# Patient Record
Sex: FEMALE | Race: WHITE | NOT HISPANIC OR LATINO | URBAN - METROPOLITAN AREA
[De-identification: names, ages, dates, MRNs, and addresses within clinical notes are randomized per-mention and may not be internally consistent; named-entity substitution may affect disease eponyms.]

---

## 2018-11-18 ENCOUNTER — SURGERY - HEALTHEAST (OUTPATIENT)
Dept: SURGERY | Facility: HOSPITAL | Age: 68
End: 2018-11-18

## 2018-11-18 ENCOUNTER — ANESTHESIA - HEALTHEAST (OUTPATIENT)
Dept: SURGERY | Facility: HOSPITAL | Age: 68
End: 2018-11-18

## 2018-11-18 ASSESSMENT — MIFFLIN-ST. JEOR
SCORE: 1176.33
SCORE: 1184.04

## 2018-11-20 ASSESSMENT — MIFFLIN-ST. JEOR: SCORE: 1185.85

## 2018-11-21 ASSESSMENT — MIFFLIN-ST. JEOR: SCORE: 1163.62

## 2018-11-22 ASSESSMENT — MIFFLIN-ST. JEOR: SCORE: 1145.48

## 2018-11-26 ENCOUNTER — RECORDS - HEALTHEAST (OUTPATIENT)
Dept: LAB | Facility: CLINIC | Age: 68
End: 2018-11-26

## 2018-11-27 LAB
ANION GAP SERPL CALCULATED.3IONS-SCNC: 8 MMOL/L (ref 5–18)
BUN SERPL-MCNC: 26 MG/DL (ref 8–22)
CALCIUM SERPL-MCNC: 9.9 MG/DL (ref 8.5–10.5)
CHLORIDE BLD-SCNC: 102 MMOL/L (ref 98–107)
CO2 SERPL-SCNC: 29 MMOL/L (ref 22–31)
CREAT SERPL-MCNC: 0.92 MG/DL (ref 0.6–1.1)
GFR SERPL CREATININE-BSD FRML MDRD: >60 ML/MIN/1.73M2
GLUCOSE BLD-MCNC: 83 MG/DL (ref 70–125)
POTASSIUM BLD-SCNC: 4.3 MMOL/L (ref 3.5–5)
SODIUM SERPL-SCNC: 139 MMOL/L (ref 136–145)

## 2018-12-03 ENCOUNTER — OFFICE VISIT - HEALTHEAST (OUTPATIENT)
Dept: GERIATRICS | Facility: CLINIC | Age: 68
End: 2018-12-03

## 2018-12-03 DIAGNOSIS — J96.01 ACUTE RESPIRATORY FAILURE WITH HYPOXIA (H): ICD-10-CM

## 2018-12-03 DIAGNOSIS — J44.9 CHRONIC OBSTRUCTIVE PULMONARY DISEASE, UNSPECIFIED COPD TYPE (H): ICD-10-CM

## 2018-12-03 DIAGNOSIS — S72.001D CLOSED FRACTURE OF RIGHT HIP WITH ROUTINE HEALING, SUBSEQUENT ENCOUNTER: ICD-10-CM

## 2018-12-03 DIAGNOSIS — D64.9 ANEMIA, UNSPECIFIED TYPE: ICD-10-CM

## 2018-12-07 ENCOUNTER — OFFICE VISIT - HEALTHEAST (OUTPATIENT)
Dept: GERIATRICS | Facility: CLINIC | Age: 68
End: 2018-12-07

## 2018-12-07 DIAGNOSIS — K59.00 CONSTIPATION, UNSPECIFIED CONSTIPATION TYPE: ICD-10-CM

## 2018-12-07 DIAGNOSIS — J44.9 CHRONIC OBSTRUCTIVE PULMONARY DISEASE, UNSPECIFIED COPD TYPE (H): ICD-10-CM

## 2018-12-07 DIAGNOSIS — S72.001D CLOSED FRACTURE OF RIGHT HIP WITH ROUTINE HEALING, SUBSEQUENT ENCOUNTER: ICD-10-CM

## 2018-12-07 DIAGNOSIS — D64.9 ANEMIA, UNSPECIFIED TYPE: ICD-10-CM

## 2018-12-07 DIAGNOSIS — F17.200 TOBACCO DEPENDENCE: ICD-10-CM

## 2018-12-07 DIAGNOSIS — E87.6 HYPOKALEMIA: ICD-10-CM

## 2018-12-10 ENCOUNTER — OFFICE VISIT - HEALTHEAST (OUTPATIENT)
Dept: GERIATRICS | Facility: CLINIC | Age: 68
End: 2018-12-10

## 2018-12-10 DIAGNOSIS — S72.001D CLOSED FRACTURE OF RIGHT HIP WITH ROUTINE HEALING, SUBSEQUENT ENCOUNTER: ICD-10-CM

## 2018-12-10 DIAGNOSIS — J44.9 CHRONIC OBSTRUCTIVE PULMONARY DISEASE, UNSPECIFIED COPD TYPE (H): ICD-10-CM

## 2018-12-10 DIAGNOSIS — D64.9 ANEMIA, UNSPECIFIED TYPE: ICD-10-CM

## 2018-12-10 DIAGNOSIS — F17.200 TOBACCO DEPENDENCE: ICD-10-CM

## 2018-12-10 RX ORDER — AMOXICILLIN 250 MG
1 CAPSULE ORAL 2 TIMES DAILY PRN
Status: SHIPPED | COMMUNITY
Start: 2018-12-10

## 2018-12-20 ENCOUNTER — OFFICE VISIT - HEALTHEAST (OUTPATIENT)
Dept: GERIATRICS | Facility: CLINIC | Age: 68
End: 2018-12-20

## 2018-12-20 DIAGNOSIS — J44.9 CHRONIC OBSTRUCTIVE PULMONARY DISEASE, UNSPECIFIED COPD TYPE (H): ICD-10-CM

## 2018-12-20 DIAGNOSIS — D64.9 ANEMIA, UNSPECIFIED TYPE: ICD-10-CM

## 2018-12-20 DIAGNOSIS — Z87.81 S/P RIGHT HIP FRACTURE: ICD-10-CM

## 2018-12-20 DIAGNOSIS — J96.01 ACUTE RESPIRATORY FAILURE WITH HYPOXIA (H): ICD-10-CM

## 2018-12-21 ENCOUNTER — RECORDS - HEALTHEAST (OUTPATIENT)
Dept: LAB | Facility: CLINIC | Age: 68
End: 2018-12-21

## 2018-12-21 LAB
ANION GAP SERPL CALCULATED.3IONS-SCNC: 9 MMOL/L (ref 5–18)
BUN SERPL-MCNC: 22 MG/DL (ref 8–22)
CALCIUM SERPL-MCNC: 9.9 MG/DL (ref 8.5–10.5)
CHLORIDE BLD-SCNC: 98 MMOL/L (ref 98–107)
CO2 SERPL-SCNC: 24 MMOL/L (ref 22–31)
CREAT SERPL-MCNC: 0.91 MG/DL (ref 0.6–1.1)
GFR SERPL CREATININE-BSD FRML MDRD: >60 ML/MIN/1.73M2
GLUCOSE BLD-MCNC: 80 MG/DL (ref 70–125)
HGB BLD-MCNC: 10.4 G/DL (ref 12–16)
POTASSIUM BLD-SCNC: 4 MMOL/L (ref 3.5–5)
SODIUM SERPL-SCNC: 131 MMOL/L (ref 136–145)

## 2018-12-30 ENCOUNTER — OFFICE VISIT - HEALTHEAST (OUTPATIENT)
Dept: GERIATRICS | Facility: CLINIC | Age: 68
End: 2018-12-30

## 2018-12-30 DIAGNOSIS — R53.81 DEBILITY: ICD-10-CM

## 2018-12-30 DIAGNOSIS — Z87.81 S/P RIGHT HIP FRACTURE: ICD-10-CM

## 2018-12-30 DIAGNOSIS — J44.9 CHRONIC OBSTRUCTIVE PULMONARY DISEASE, UNSPECIFIED COPD TYPE (H): ICD-10-CM

## 2018-12-30 DIAGNOSIS — D64.9 ANEMIA, UNSPECIFIED TYPE: ICD-10-CM

## 2019-01-07 ENCOUNTER — OFFICE VISIT - HEALTHEAST (OUTPATIENT)
Dept: GERIATRICS | Facility: CLINIC | Age: 69
End: 2019-01-07

## 2019-01-07 DIAGNOSIS — J44.9 CHRONIC OBSTRUCTIVE PULMONARY DISEASE, UNSPECIFIED COPD TYPE (H): ICD-10-CM

## 2019-01-07 DIAGNOSIS — Z87.81 S/P RIGHT HIP FRACTURE: ICD-10-CM

## 2019-01-07 DIAGNOSIS — R53.81 DEBILITY: ICD-10-CM

## 2019-01-07 DIAGNOSIS — D64.9 ANEMIA, UNSPECIFIED TYPE: ICD-10-CM

## 2019-01-07 RX ORDER — BISACODYL 10 MG
10 SUPPOSITORY, RECTAL RECTAL DAILY PRN
Status: SHIPPED | COMMUNITY
Start: 2019-01-07

## 2019-01-07 RX ORDER — POLYETHYLENE GLYCOL 3350 17 G/17G
17 POWDER, FOR SOLUTION ORAL DAILY PRN
Status: SHIPPED | COMMUNITY
Start: 2019-01-07

## 2019-01-08 ENCOUNTER — RECORDS - HEALTHEAST (OUTPATIENT)
Dept: LAB | Facility: CLINIC | Age: 69
End: 2019-01-08

## 2019-01-08 LAB
ANION GAP SERPL CALCULATED.3IONS-SCNC: 8 MMOL/L (ref 5–18)
BUN SERPL-MCNC: 19 MG/DL (ref 8–22)
CALCIUM SERPL-MCNC: 10 MG/DL (ref 8.5–10.5)
CHLORIDE BLD-SCNC: 97 MMOL/L (ref 98–107)
CO2 SERPL-SCNC: 24 MMOL/L (ref 22–31)
CREAT SERPL-MCNC: 0.86 MG/DL (ref 0.6–1.1)
GFR SERPL CREATININE-BSD FRML MDRD: >60 ML/MIN/1.73M2
GLUCOSE BLD-MCNC: 90 MG/DL (ref 70–125)
POTASSIUM BLD-SCNC: 4 MMOL/L (ref 3.5–5)
SODIUM SERPL-SCNC: 129 MMOL/L (ref 136–145)

## 2019-01-09 ENCOUNTER — AMBULATORY - HEALTHEAST (OUTPATIENT)
Dept: GERIATRICS | Facility: CLINIC | Age: 69
End: 2019-01-09

## 2019-01-11 ENCOUNTER — RECORDS - HEALTHEAST (OUTPATIENT)
Dept: LAB | Facility: CLINIC | Age: 69
End: 2019-01-11

## 2019-01-11 LAB — SODIUM SERPL-SCNC: 129 MMOL/L (ref 136–145)

## 2021-01-01 ENCOUNTER — RECORDS - HEALTHEAST (OUTPATIENT)
Dept: ADMINISTRATIVE | Facility: CLINIC | Age: 71
End: 2021-01-01

## 2021-01-01 ENCOUNTER — COMMUNICATION - HEALTHEAST (OUTPATIENT)
Dept: SCHEDULING | Facility: CLINIC | Age: 71
End: 2021-01-01

## 2021-01-01 VITALS — BODY MASS INDEX: 27.51 KG/M2 | WEIGHT: 150.4 LBS

## 2021-01-01 VITALS — BODY MASS INDEX: 27.44 KG/M2 | WEIGHT: 150 LBS

## 2021-01-01 VITALS — BODY MASS INDEX: 27.27 KG/M2 | WEIGHT: 148.2 LBS | HEIGHT: 62 IN

## 2021-06-16 PROBLEM — S72.001A HIP FRACTURE, RIGHT, CLOSED, INITIAL ENCOUNTER (H): Status: ACTIVE | Noted: 2018-11-18

## 2021-06-18 NOTE — LETTER
Letter by Marilyn Arias MD at      Author: Marilyn Arias MD Service: -- Author Type: --    Filed:  Encounter Date: 12/30/2018 Status: (Other)         Patient: Johana Santiago   MR Number: [id number]   YOB: 1950   Date of Visit: 12/30/2018     [facility]    Facility:   [facility] [[id number]]   Code Status: FULL CODE       Chief Complaint   Patient presents with   ? Follow Up     LTC 12/30/18.       HPI:   Johana is a 68 y.o. female who was recently hospitalized as per below.     67-year-old female with past medical history of hypertension, hyperlipidemia, chronic back pain, COPD, smoking abuse, diastolic CHF, who presented to the emergency room for evaluation of right hip pain after fall, she was admitted with hip fracture.    Patient postoperatively developed acute pulmonary edema with respiratory failure, requiring transfer to ICU for BiPAP.     Acute respiratory failure with hypoxia and hypercapnia,  - due to mucous plugging, volume overload/pulmonary edema, and community-acquired pneumonia plus history of COPD  - Improving, patient is off BiPAP .  - Appreciate pulmonary consult  - Continue with scheduled DuoNeb, as needed albuterol neb.    - Transfer out of ICU yesterday  - Patient still requires oxygen, down to 2- 3 L, likely will need oxygen at TCU given her long COPD history     Acute on chronic CHF   - Diastolic dysfunction, with pulmonary edema and respiratory failure.   - Probably precipitated by mucous plug and pneumonia  - BNP elevated at 581.  - Echo showed EF 67% plus diastolic dysfunction.  - Bedside IVC ultrasound on 11/21-showed non-collapsible IVC.    - Improving with IV diuresis   - Switch to oral Lasix  - Follow-up with CHF clinic as outpatient     Sepsis  - Probably secondary to pneumonia and/or mucous plugging.   - Improving  - Continue antibiotic with ceftriaxone and doxycycline.   - White blood cells is  improving     Acute right hip fracture  - Status post surgery  - POD#7  - Appreciate orthopedic consult  - Postoperative orders as per orthopedics  - Continue PT/OT evaluation  - Plan for TCU on discharge     Essential hypertension  - holding home antihypertensive, to give room for diuresis.  - Restart low dose of metoprolol because of increased heart rate with holding paramedics  - Continue to monitor blood pressure at TCU     JANE (acute kidney injury)   - Probably secondary to sepsis  - Improving, discontinue IV fluid  - Avoid nephrotoxins drugs  - Continue to monitor renal function at TCU     COPD  - Chronic, patient was still smoking  - Discussed with patient to quit smoking  - Continue inhalers and nebs  - Continue  oxygen at TCU with plan to wean off as tolerated     Anxiety and depression  - continue home medications     Chronic back pain   - Stable  - Continue with pain control as above      Hypokalemia  - Replace per protocol  - Monitor potassium level at TCU     Smoking dependence  - Discussed with patient to quit  - Continue nicotine patch     Weakness and deconditioning  - Secondary to above  - PT/OT evaluation  - Plan for TCU on discharge     Overall stabilized and discharged to TCU on 11/25/18 for PT, OT, nursing cares, medical management and monitoring.       Past Medical History:  Past Medical History:   Diagnosis Date   ? Chronic bilateral low back pain    ? Depression    ? HTN (hypertension)    ? Idiopathic scoliosis of lumbar region    ? Incontinent of urine    ? Tobacco abuse        Medications:  Current Outpatient Medications   Medication Sig   ? acetaminophen (TYLENOL) 500 MG tablet Take 2 tablets (1,000 mg total) by mouth 3 (three) times a day. (Patient taking differently: Take 1,000 mg by mouth 3 (three) times a day And 1000 mg  once daily prn.      )   ? albuterol (PROAIR HFA;PROVENTIL HFA;VENTOLIN HFA) 90 mcg/actuation inhaler Inhale 2 puffs every 6 (six) hours as needed for wheezing.   ?  amLODIPine (NORVASC) 5 MG tablet Take 5 mg by mouth every evening.   ? aspirin 325 MG tablet Take 1 tablet (325 mg total) by mouth 2 (two) times a day.   ? FLUoxetine (PROZAC) 20 MG capsule Take 20 mg by mouth daily Take with 40 mg capsule for a total daily dose of 60 mg .   ? FLUoxetine (PROZAC) 40 MG capsule Take 40 mg by mouth daily Take with 20 mg capsule for a total daily dose of 60 mg .   ? furosemide (LASIX) 20 MG tablet Take 1 tablet (20 mg total) by mouth daily.   ? gabapentin (NEURONTIN) 100 MG capsule Take 100 mg by mouth 2 (two) times a day.   ? ipratropium-albuterol (DUO-NEB) 0.5-2.5 mg/3 mL nebulizer Take 3 mL by nebulization 4 (four) times a day.   ? metoprolol tartrate (LOPRESSOR) 50 MG tablet Take 0.5 tablets (25 mg total) by mouth 2 (two) times a day.   ? omeprazole (PRILOSEC) 40 MG capsule Take 40 mg by mouth daily before breakfast.   ? potassium chloride (KLOR-CON) 20 mEq packet Take 20 mEq by mouth 2 (two) times a day.   ? rizatriptan (MAXALT) 10 MG tablet Take 10 mg by mouth as needed for migraine May repeat in 2 hours if needed .   ? senna-docusate (SENNOSIDES-DOCUSATE SODIUM) 8.6-50 mg tablet Take 1 tablet by mouth 2 (two) times a day.   ? sucralfate (CARAFATE) 1 gram tablet Take 1 g by mouth daily as needed.       Physical Exam:   General: Patient is alert.  Vitals: Reviewed.  HEENT: Head is NCAT. Eyes show no injection or icterus. Nares negative. Oropharynx well hydrated.  Neck: Supple. No tenderness or adenopathy. No JVD.  Lungs: Clear bilaterally. No wheezes.  Cardiovascular: Regular rate and rhythm, normal S1. S2.  Back: No spinal or CVA tenderness.  Abdomen: Soft, no tenderness on exam. Bowel sounds present. No guarding rebound or rigidity.  Extremities: No distal edema is noted.  Musculoskeletal: Healing right hip incision.   Psych: Mood appears good.      Labs:  Results for orders placed or performed in visit on 11/27/18   Basic Metabolic Panel   Result Value Ref Range    Sodium 139  136 - 145 mmol/L    Potassium 4.3 3.5 - 5.0 mmol/L    Chloride 102 98 - 107 mmol/L    CO2 29 22 - 31 mmol/L    Anion Gap, Calculation 8 5 - 18 mmol/L    Glucose 83 70 - 125 mg/dL    Calcium 9.9 8.5 - 10.5 mg/dL    BUN 26 (H) 8 - 22 mg/dL    Creatinine 0.92 0.60 - 1.10 mg/dL    GFR MDRD Af Amer >60 >60 mL/min/1.73m2    GFR MDRD Non Af Amer >60 >60 mL/min/1.73m2       Lab Results   Component Value Date    WBC 11.5 (H) 11/25/2018    HGB 10.4 (L) 12/21/2018    HCT 30.4 (L) 11/25/2018    MCV 92 11/25/2018     (H) 11/25/2018         Assessment/Plan:  1. Right hip fx. S/p surgical repair on 11/18/18. Follow up per ortho, continue here in therapy.  2. Anemia. Post op acute blood loss. Stable.  3. COPD. Now off oxygen, stable.  4. Debility. Encourage therapy, mobility, strengthening.          Electronically signed by: Marilyn Arias MD

## 2021-06-18 NOTE — LETTER
Letter by Nathalia Carroll CNP at      Author: Nathalia Carroll CNP Service: -- Author Type: --    Filed:  Encounter Date: 1/7/2019 Status: (Other)         Patient: Johana Santiago   MR Number: [id number]   YOB: 1950   Date of Visit: 1/7/2019     [facility]    Facility:   [facility] [[id number]]   Code Status: FULL CODE  PCP: Meghan Latham MD   Phone: [phone]   Fax: [phone]      CHIEF COMPLAINT/REASON FOR VISIT:  Chief Complaint   Patient presents with   ? Discharge Summary       HISTORY COURSE:  Johana is a 68 y.o. female undergoing physical , occupational  and speech therapy at [facility]. She is with past medical history of hypertension, hyperlipidemia, chronic back pain, COPD, smoking abuse, diastolic CHF, who presented to the emergency room for evaluation of right hip pain after fall, she was admitted with hip fracture.  Patient postoperatively developed acute pulmonary edema with respiratory failure, requiring transfer to ICU for BiPAP.     Today she is seen in her room as a routine visit to review multiple medical issues and a face to face for discharge. She is off the oxygen and no longer using the nebulizer Her hip incision is healel. She did follow up with orthopedics today.  She denied CP or shortness of breath. She denied  Congestion but reports a dry intermittent cough.  Right leg  slightly adducted. She tells me the MD is aware and that she has severe scoliosis. BP's on the lower end and metoprolol decreased. she denied dizziness. Last NA low at 131 and lab to be checked prior to discharge tomorrow. If stable she will discharge to home with her son. She will continue current medications and treatments and will have [facility] home care services PT/OT/HHA and Nursing .       Face to face for discharge and wheelchair.   Due to the diagnosis of fracture of unspecified part of neck of right femur subsequent encounter for  closed fracture with routine healing, acute respiratory failure with hypoxia, acute respiratory failure with hypercapnia, hypoxemia, history of falling, pain, and weakness, my patient has the following limitations of mobility/inability to participate in MR ADLs as such as food progress and transfer and ambulation she also cannot walk with an assistive device such as a cane or walker because the patient has weakness causing impaired balance and places her at high risk for falls her current home has adequate space for maneuvering a manual wheelchair, the patient and caregiver are able to safely use a manual wheelchair, the patient will use a wheelchair daily and use of the wheelchair will improve the participation in MRA DLS for the patient.  Patient will require and benefit from an 18 x 18 inch standard manual wheelchair with bilateral standard foot rest, bilateral full-length armrests and 18 x 18 inch cushion.  A cushion is necessary since by patient sits in the wheelchair for greater than 8 hours/day placing her at high risk for skin breakdown.  This equipment is necessary for the duration of my patients lifetime.    Review of Systems  Constitutional: Positive for fatigue. Negative for activity change, appetite change and fever.   HENT: Negative for congestion.    Respiratory: Negative for cough, shortness of breath and wheezing.    Cardiovascular: Negative for chest pain and leg swelling.   Gastrointestinal:. Negative for abdominal distention, abdominal pain, diarrhea and nausea.   Genitourinary: Negative for dysuria.   Musculoskeletal: Positive for arthralgias. Negative for back pain.   Skin: Negative for color change and wound.   Neurological: Negative for dizziness.   Psychiatric/Behavioral: Negative for agitation, behavioral problems and confusion.          Vitals:    01/07/19 1331   BP: 112/75   Pulse: 86   Resp: 18   Temp: 97.2  F (36.2  C)   SpO2: 93%   Weight: 150 lb 6.4 oz (68.2 kg)       Physical  Exam  Constitutional: She is oriented to person, place, and time. She appears well-developed and well-nourished.   pleasant woman in no acute distress   HENT:   Head: Normocephalic and atraumatic.   Eyes: Conjunctivae are normal. Pupils are equal, round, and reactive to light.   Neck: Normal range of motion. Neck supple.   Cardiovascular: Normal rate, regular rhythm and normal heart sounds.   No murmur heard.  Pulmonary/Chest: Effort normal and breath sounds normal. She has no wheezes. She has no rales.   No longer requiring oxygen.  Abdominal: Soft. Bowel sounds are normal. She exhibits no distension. There is no tenderness.   Musculoskeletal: She exhibits no edema.   Decreased ROM RLE   right leg with a slight adduction  Neurological: She is alert and oriented to person, place, and time.   Skin: Skin is warm and dry.   Right hip incision - healed.   Psychiatric: She has a normal mood and affect. Her behavior is normal.       MEDICATION LIST:  Current Outpatient Medications   Medication Sig   ? acetaminophen (TYLENOL) 500 MG tablet Take 2 tablets (1,000 mg total) by mouth 3 (three) times a day. (Patient taking differently: Take 1,000 mg by mouth 3 (three) times a day And 1000 mg  once daily prn.      )   ? albuterol (PROAIR HFA;PROVENTIL HFA;VENTOLIN HFA) 90 mcg/actuation inhaler Inhale 2 puffs every 6 (six) hours as needed for wheezing.   ? bisacodyl (DULCOLAX) 10 mg suppository Insert 10 mg into the rectum daily as needed.   ? FLUoxetine (PROZAC) 20 MG capsule Take 20 mg by mouth daily Take with 40 mg capsule for a total daily dose of 60 mg .   ? FLUoxetine (PROZAC) 40 MG capsule Take 40 mg by mouth daily Take with 20 mg capsule for a total daily dose of 60 mg .   ? furosemide (LASIX) 20 MG tablet Take 1 tablet (20 mg total) by mouth daily.   ? gabapentin (NEURONTIN) 100 MG capsule Take 100 mg by mouth 2 (two) times a day.   ? ipratropium-albuterol (DUO-NEB) 0.5-2.5 mg/3 mL nebulizer Take 3 mL by nebulization 4  (four) times a day.   ? metoprolol tartrate (LOPRESSOR) 50 MG tablet Take 0.5 tablets (25 mg total) by mouth 2 (two) times a day. (Patient taking differently: Take 12.5 mg by mouth daily.       )   ? omeprazole (PRILOSEC) 40 MG capsule Take 40 mg by mouth daily before breakfast.   ? polyethylene glycol (GLYCOLAX) 17 gram/dose powder Take 17 g by mouth daily as needed.   ? potassium chloride (KLOR-CON) 20 mEq packet Take 20 mEq by mouth 2 (two) times a day.   ? rizatriptan (MAXALT) 10 MG tablet Take 10 mg by mouth as needed for migraine May repeat in 2 hours if needed .   ? senna-docusate (SENNOSIDES-DOCUSATE SODIUM) 8.6-50 mg tablet Take 1 tablet by mouth 2 (two) times a day.   ? sucralfate (CARAFATE) 1 gram tablet Take 1 g by mouth daily as needed.   ? aspirin 325 MG tablet Take 1 tablet (325 mg total) by mouth 2 (two) times a day.       DISCHARGE DIAGNOSIS:    ICD-10-CM    1. S/P right hip fracture Z87.81    2. Anemia, unspecified type D64.9    3. Chronic obstructive pulmonary disease, unspecified COPD type (H) J44.9    4. Debility R53.81        MEDICAL EQUIPMENT NEEDS:  wheelchair    DISCHARGE PLAN/FACE TO FACE:  I certify that services are/were furnished while this patient was under the care of a physician and that a physician or an allowed non-physician practitioner (NPP), had a face-to-face encounter that meets the physician face-to-face encounter requirements. The encounter was in whole, or in part, related to the primary reason for home health. The patient is confined to his/her home and needs intermittent skilled nursing, physical therapy, speech-language pathology, or the continued need for occupational therapy. A plan of care has been established by a physician and is periodically reviewed by a physician.  Date of Face-to-Face Encounter:1/7/19    I certify that, based on my findings, the following services are medically necessary home health services: [facility] PT/OT/HHA and Nursing     My clinical findings  support the need for the above skilled services because: PT/OT for continued strength and endurance following a right hip fracture, HHA to assist with ADl's, and Nursing for medication management     This patient is homebound because:She is deconditioned and easily fatigued following right hip surgery and also with a DX of COPD. She also requires the use of a wheelchair making her unsafe to leave the home unassisted.   The patient is, or has been, under my care and I have initiated the establishment of the plan of care. This patient will be followed by a physician who will periodically review the plan of care.    Schedule follow up visit with primary care provider within 7 days to reestablish care.    Electronically signed by: Nathalia Carroll CNP       Electronically signed by: Marilyn Arias MD

## 2021-06-21 NOTE — ANESTHESIA CARE TRANSFER NOTE
Last vitals:   Vitals:    11/18/18 1734   BP: 167/78   Pulse: 91   Resp: 19   Temp: 37  C (98.6  F)   SpO2: 94%     Patient's level of consciousness is drowsy  Spontaneous respirations: yes  Maintains airway independently: yes  Dentition unchanged: yes  Oropharynx: oropharynx clear of all foreign objects    QCDR Measures:  ASA# 20 - Surgical Safety Checklist: WHO surgical safety checklist completed prior to induction    PQRS# 430 - Adult PONV Prevention: 4558F - Pt received => 2 anti-emetic agents (different classes) preop & intraop  ASA# 8 - Peds PONV Prevention: NA - Not pediatric patient, not GA or 2 or more risk factors NOT present  PQRS# 424 - Ellie-op Temp Management: 4559F - At least one body temp DOCUMENTED => 35.5C or 95.9F within required timeframe  PQRS# 426 - PACU Transfer Protocol: - Transfer of care checklist used  ASA# 14 - Acute Post-op Pain: ASA14B - Patient did NOT experience pain >= 7 out of 10

## 2021-06-21 NOTE — ANESTHESIA POSTPROCEDURE EVALUATION
Patient: Johana Santiago  INTERNAL FIXATION, FRACTURE, HIP, WITH NAIL AND SCREWS  Anesthesia type: general    Patient location: PACU  Last vitals:   Vitals:    11/18/18 1800   BP: 145/63   Pulse: 100   Resp: 17   Temp:    SpO2: 97%     Post vital signs: stable  Level of consciousness: awake and responds to simple questions  Post-anesthesia pain: pain controlled  Post-anesthesia nausea and vomiting: no  Pulmonary: unassisted, return to baseline  Cardiovascular: stable and blood pressure at baseline  Hydration: adequate  Anesthetic events: no    QCDR Measures:  ASA# 11 - Ellie-op Cardiac Arrest: ASA11B - Patient did NOT experience unanticipated cardiac arrest  ASA# 12 - Ellie-op Mortality Rate: ASA12B - Patient did NOT die  ASA# 13 - PACU Re-Intubation Rate: ASA13B - Patient did NOT require a new airway mgmt  ASA# 10 - Composite Anes Safety: ASA10A - No serious adverse event    Additional Notes:

## 2021-06-21 NOTE — ANESTHESIA PREPROCEDURE EVALUATION
Anesthesia Evaluation      Patient summary reviewed   No history of anesthetic complications     Airway   Mallampati: II  Neck ROM: full   Pulmonary - normal exam   (+) a smoker                         Cardiovascular - normal exam  Exercise tolerance: > or = 4 METS  (+) hypertension, , hypercholesterolemia,      Neuro/Psych    (+) depression,     Endo/Other - negative ROS      GI/Hepatic/Renal    (+) GERD well controlled,   chronic renal disease CRI,      Other findings: Hx by scribe from ED is not specific in terms of diagnoses, hx largely from pt and from meds she is on.  Inhalers when has allergies.  GFR 40, K 3.9, WBC 17, Hg 14.1, negative troponin. Upper and lower partials.      Dental    (+) lower dentures and upper dentures                       Anesthesia Plan  Planned anesthetic: spinal  Gen if unable to do SAB.  ASA 3     Anesthetic plan and risks discussed with: patient    Post-op plan: routine recovery

## 2021-06-21 NOTE — ANESTHESIA PROCEDURE NOTES
Spinal Block    Patient location during procedure: OR  Reason for block: primary anesthetic    Staffing:  Performing  Anesthesiologist: Ayaz Walton MD    Preanesthetic Checklist  Completed: patient identified, risks, benefits, and alternatives discussed, timeout performed, consent obtained, airway assessed, oxygen available, suction available, emergency drugs available and hand hygiene performed  Spinal Block  Patient position: left lateral decubitus  Prep: ChloraPrep  Patient monitoring: heart rate, cardiac monitor, continuous pulse ox and blood pressure  Approach: midline  Location: L3-4      Additional Notes:  Multiple passes at 2 levels in midline with 24g spinal needle, the L paramedian; same with 22g needle, unable to get CSF.  Significant curvature noted clinically and on x-rays, so abandoned technique and went to GEN.

## 2021-06-22 NOTE — PROGRESS NOTES
[facility]      Facility:    [facility] [[id number]]  Code Status: FULL CODE       Chief Complaint/Reason for Visit:  Chief Complaint   Patient presents with     H & P     New admit to TCU for hypoxic respiratory failure.        HPI:   Johana is a 68 y.o. female who     Johana Santiago is a 67 y.o. old female with a medical history significant for hypertension, hyperlipidemia, anxiety and depression presents with right hip pain after a fall. Patient reports that she accidentally fell two days ago when she accidentally tripped on the area rug at home. After the fall, she developed right hip pain and needed help to get up. She was not able to walk. She did not come to the hospital because she is hoping that her hip pain could get better over time. However, the right hip gets worse over the last two days and she can not put weight on her right leg. She then came to ER today. In ER, she was found to have right hip fracture. Patient states that she normally walks with a walker or cane due to imbalance because she has chronic lower back pain. No history of stroke. Denies history of heart failure, cardiac arrhythmia, lung diseases or CKD.   67-year-old female with past medical history of hypertension, hyperlipidemia, chronic back pain, COPD, smoking abuse, diastolic CHF, who presented to the emergency room for evaluation of right hip pain after fall, she was admitted with hip fracture.    Patient postoperatively developed acute pulmonary edema with respiratory failure, requiring transfer to ICU for BiPAP.     Acute respiratory failure with hypoxia and hypercapnia,  - due to mucous plugging, volume overload/pulmonary edema, and community-acquired pneumonia plus history of COPD  - Improving, patient is off BiPAP .  - Appreciate pulmonary consult  - Continue with cheduled DuoNeb, as needed albuterol neb.    - Transfer out of ICU yesterday  - Patient still requires oxygen,  down to 2- 3 L, likely will need oxygen at TCU given her long COPD history     Acute on chronic CHF   - Diastolic dysfunction, with pulmonary edema and respiratory failure.   - Probably precipitated by mucous plug and pneumonia  - BNP elevated at 581.  - Echo showed EF 67% plus diastolic dysfunction.  - Bedside IVC ultrasound on 11/21-showed non-collapsible IVC.    - Improving with IV diuresis   - Switch to oral Lasix  - Follow-up with CHF clinic as outpatient     Sepsis  - Probably secondary to pneumonia and/or mucous plugging.   - Improving  - Continue antibiotic with ceftriaxone and doxycycline.   - White blood cells is improving     Acute right hip fracture  - Status post surgery  - POD#7  - Appreciate orthopedic consult  - Postoperative orders as per orthopedics  - Continue PT/OT evaluation  - Plan for TCU on discharge     Essential hypertension  - holding home antihypertensive, to give room for diuresis.  - Restart low dose of metoprolol because of increased heart rate with holding paramedics  - Continue to monitor blood pressure at TCU     JANE (acute kidney injury)   - Probably secondary to sepsis  - Improving, discontinue IV fluid  - Avoid nephrotoxins drugs  - Continue to monitor renal function at TCU     COPD  - Chronic, patient was still smoking  - Discussed with patient to quit smoking  - Continue inhalers and nebs  - Continue  oxygen at TCU with plan to wean off as tolerated     Anxiety and depression  - continue home medications     Chronic back pain   - Stable  - Continue with pain control as above      Hypokalemia  - Replace per protocol  - Monitor potassium level at TCU     Smoking dependence  - Discussed with patient to quit  - Continue nicotine patch     Weakness and deconditioning  - Secondary to above  - PT/OT evaluation  - Plan for TCU on discharge     CODE STATUS  Code     Discussed with family , the patient, orthopedics, nursing staff and discharge planner         Overall stabilized and  discharged to TCU on 11/25/18 for PT, OT, nursing cares, medical management and monitoring.     Today:      Past Medical History:  Past Medical History:   Diagnosis Date     Chronic bilateral low back pain      Depression      HTN (hypertension)      Idiopathic scoliosis of lumbar region      Incontinent of urine      Tobacco abuse            Surgical History:  Past Surgical History:   Procedure Laterality Date     HIP PINNING Right 11/18/2018    Procedure: INTERNAL FIXATION, FRACTURE, HIP, WITH NAIL AND SCREWS;  Surgeon: Karson Lopez MD;  Location: [facility];  Service: Orthopedics     TOTAL KNEE ARTHROPLASTY Bilateral        Family History:   Family History   Problem Relation Age of Onset     Chronic Kidney Disease Father      Diabetes Father        Social History:    Social History     Socioeconomic History     Marital status:      Spouse name: Not on file     Number of children: Not on file     Years of education: Not on file     Highest education level: Not on file   Social Needs     Financial resource strain: Not on file     Food insecurity - worry: Not on file     Food insecurity - inability: Not on file     Transportation needs - medical: Not on file     Transportation needs - non-medical: Not on file   Occupational History     Not on file   Tobacco Use     Smoking status: Current Every Day Smoker     Packs/day: 0.50     Years: 25.00     Pack years: 12.50     Types: Cigarettes   Substance and Sexual Activity     Alcohol use: No     Frequency: Never     Drug use: No     Sexual activity: Not on file   Other Topics Concern     Not on file   Social History Narrative     Not on file       Medications:  Current Outpatient Medications   Medication Sig     acetaminophen (TYLENOL) 500 MG tablet Take 2 tablets (1,000 mg total) by mouth 3 (three) times a day.     albuterol (PROAIR HFA;PROVENTIL HFA;VENTOLIN HFA) 90 mcg/actuation inhaler Inhale 2 puffs every 6 (six) hours as needed for wheezing.      amLODIPine (NORVASC) 5 MG tablet Take 5 mg by mouth every evening.     aspirin 325 MG tablet Take 1 tablet (325 mg total) by mouth 2 (two) times a day.     FLUoxetine (PROZAC) 20 MG capsule Take 20 mg by mouth daily Take with 40 mg capsule for a total daily dose of 60 mg .     FLUoxetine (PROZAC) 40 MG capsule Take 40 mg by mouth daily Take with 20 mg capsule for a total daily dose of 60 mg .     furosemide (LASIX) 20 MG tablet Take 1 tablet (20 mg total) by mouth daily.     gabapentin (NEURONTIN) 100 MG capsule Take 100 mg by mouth 2 (two) times a day.     ipratropium-albuterol (DUO-NEB) 0.5-2.5 mg/3 mL nebulizer Take 3 mL by nebulization 4 (four) times a day.     metoprolol tartrate (LOPRESSOR) 50 MG tablet Take 0.5 tablets (25 mg total) by mouth 2 (two) times a day.     nicotine (NICODERM CQ) 14 mg/24 hr Place 1 patch on the skin daily.     omeprazole (PRILOSEC) 40 MG capsule Take 40 mg by mouth daily before breakfast.     potassium chloride (KLOR-CON) 20 mEq packet Take 20 mEq by mouth 2 (two) times a day.     rizatriptan (MAXALT) 10 MG tablet Take 10 mg by mouth as needed for migraine May repeat in 2 hours if needed .     sucralfate (CARAFATE) 1 gram tablet Take 1 g by mouth daily as needed.       Allergies:  No Known Allergies      Review of Systems:  Pertinent items are noted in HPI.      Physical Exam:   General: Patient is alert,   Vitals: /70, 96/54, Temp 97.8, Pulse 70, RR 20, O2 sat 96% on O2.  HEENT: Head is NCAT. Eyes show no injection or icterus. Nares negative. Oropharynx well hydrated.  Neck: Supple. No tenderness or adenopathy. No JVD.  Lungs: Clear bilaterally. No wheezes.  Cardiovascular: Regular rate and rhythm, normal S1. S2.  Back: No spinal or CVA tenderness.  Abdomen: Soft, no tenderness on exam. Bowel sounds present. No guarding rebound or rigidity.  : Deferred.  Extremities: No edema is noted.  Musculoskeletal:   Skin:  Psych: Mood appears good.      Labs:  Results for orders  placed or performed in visit on 11/27/18   Basic Metabolic Panel   Result Value Ref Range    Sodium 139 136 - 145 mmol/L    Potassium 4.3 3.5 - 5.0 mmol/L    Chloride 102 98 - 107 mmol/L    CO2 29 22 - 31 mmol/L    Anion Gap, Calculation 8 5 - 18 mmol/L    Glucose 83 70 - 125 mg/dL    Calcium 9.9 8.5 - 10.5 mg/dL    BUN 26 (H) 8 - 22 mg/dL    Creatinine 0.92 0.60 - 1.10 mg/dL    GFR MDRD Af Amer >60 >60 mL/min/1.73m2    GFR MDRD Non Af Amer >60 >60 mL/min/1.73m2       Lab Results   Component Value Date    WBC 11.5 (H) 11/25/2018    HGB 10.2 (L) 11/25/2018    HCT 30.4 (L) 11/25/2018    MCV 92 11/25/2018     (H) 11/25/2018         Assessment/Plan:  1. Hypoxic respiratory failure. Post op pneumonia. Wean oxygen as able.  2. COPD.  3. Right hip fracture. S/p surgical repair.   4. Anemia.  5. Tobacco dependence  6. Code status is full code.        Total time greater than 55 minutes, greater than 50% counseling and coordination of care, time spent in interview and examination of patient, review of records, discussion with nursing staff.          Electronically signed by: Marilyn Arias MD

## 2021-06-22 NOTE — PROGRESS NOTES
Code Status:  FULL CODE  Visit Type: Follow Up     Facility:  [facility] [[id number]]        Facility Type: SNF (Skilled Nursing Facility, TCU)    History of Present Illness: Johana Santiago is a 68 y.o. female seen today for TCU follow up. She has medical history for COPD, tobacco dependence, diastolic HF, HTN, HLD, anxiety and depression. She was recently hospitalized for a fall at home in which she sustained a hip fraction and is s/p ORIF.  Post operatively had acute pulmonary edema and was in the ICU on bipap.  She was eventually weaned to O2 by NC and was discharged to the TCU.  She also had hypokalemia in the hospital and lasix was adjusted.      Today, she reports her pain is stable and she is getting along in therapy.  Her BPs have been good with SBPs 90-100s andher wt is stable at 150.9lbs.  She is breathing well on 2L O2 and using duonebs every 6 hours. LS have faint crackle in right base and left base is clear.  She is afebrile and is abstaining from smoking with the nicotine patch.  She states she is motivated to maintain this when at home.  She states she constipated today and does not have anything for constipation.       Review of Systems   Patient denies fever, chills, headache, lightheadedness, dizziness, rhinorrhea, cough, congestion, shortness of breath, chest pain, palpitations, abdominal pain, n/v, diarrhea, constipation, change in appetite, dysuria, frequency, burning or pain with urination.  Otherwise review of systems are negative.         Physical Exam   Vital signs: /64, HR 70, resp 20, 97.1 150.9lbs.   GENERAL APPEARANCE: Well developed, well nourished, in no acute distress.  HEENT: normocephalic, atraumatic  PERRL, sclerae anicteric, conjunctivae pink and moist, EOM intact  NECK: Supple and symmetric. Trachea is midline, no thyromegaly, no adenopathy, and no tenderness  LUNGS: Right base has slight inspiratory crackle, Left base clear. respiratory effort  normal.  CARD: RRR, S1, S2, without murmurs, gallops, rubsABD: Soft and nontender with normal bowel sounds.   MSK: Muscle strength and tone were normal.  EXTREMITIES: No cyanosis, clubbing or edema.  NEURO: Alert and oriented x 3. Normal affect. Sensation to touch was normal. Face is symmetric.  SKIN: Inspection of the skin reveals no rashes, ulcerations or petechiae.  PSYCH: euthymic          Labs:  No results found for this or any previous visit (from the past 240 hour(s)).    Assessment:  1. Closed fracture of right hip with routine healing, subsequent encounter     2. Chronic obstructive pulmonary disease, unspecified COPD type (H)     3. Anemia, unspecified type     4. Hypokalemia     5. Constipation, unspecified constipation type     6. Tobacco dependence         Plan:   Hip ORIF: stable; pain improving on scheduled tylenol with good relief  COPD: controlled; continue duo-nebs; if continues controlled consider changing to daily tiotropium/albuterol inhaler next week.   Anemia: last hgb at dc was 10.2 and WBC was 11.5. She is afebrile so will draw CBC on Monday.   Hypokalemia: last K at dc was 4.3 will draw K on Monday  Constipation: start prn Miralax for intermittent constipation  Tobacco dependence: counseled on abstinence and risk and benefit.       Amena STANLEY CNP,saw and examined the patient and case discussed with Nathalia Carroll CNP.  Nathalia STANLEY CNP, I examined the patient with Amena Mayo and discussed and agree with the medical care given.      Electronically signed by: Nathalia Carroll CNP

## 2021-06-22 NOTE — PROGRESS NOTES
[facility]    Facility:   [facility] [[id number]]   Code Status: FULL CODE  PCP: Meghan Latham MD   Phone: [phone]   Fax: [phone]      CHIEF COMPLAINT/REASON FOR VISIT:  Chief Complaint   Patient presents with     Discharge Summary       HISTORY COURSE:  Johana is a 68 y.o. female undergoing physical , occupational  and speech therapy at [facility]. She is with past medical history of hypertension, hyperlipidemia, chronic back pain, COPD, smoking abuse, diastolic CHF, who presented to the emergency room for evaluation of right hip pain after fall, she was admitted with hip fracture.  Patient postoperatively developed acute pulmonary edema with respiratory failure, requiring transfer to ICU for BiPAP.     Today she is seen in her room as a routine visit to review multiple medical issues and a face to face for discharge. She is off the oxygen and no longer using the nebulizer Her hip incision is healel. She did follow up with orthopedics today.  She denied CP or shortness of breath. She denied  Congestion but reports a dry intermittent cough.  Right leg  slightly adducted. She tells me the MD is aware and that she has severe scoliosis. BP's on the lower end and metoprolol decreased. she denied dizziness. Last NA low at 131 and lab to be checked prior to discharge tomorrow. If stable she will discharge to home with her son. She will continue current medications and treatments and will have hospitals home care services PT/OT/HHA and Nursing .       Face to face for discharge and wheelchair.   Due to the diagnosis of fracture of unspecified part of neck of right femur subsequent encounter for closed fracture with routine healing, acute respiratory failure with hypoxia, acute respiratory failure with hypercapnia, hypoxemia, history of falling, pain, and weakness, my patient has the following limitations of mobility/inability to participate in MR ADLs  as such as food progress and transfer and ambulation she also cannot walk with an assistive device such as a cane or walker because the patient has weakness causing impaired balance and places her at high risk for falls her current home has adequate space for maneuvering a manual wheelchair, the patient and caregiver are able to safely use a manual wheelchair, the patient will use a wheelchair daily and use of the wheelchair will improve the participation in MRA DLS for the patient.  Patient will require and benefit from an 18 x 18 inch standard manual wheelchair with bilateral standard foot rest, bilateral full-length armrests and 18 x 18 inch cushion.  A cushion is necessary since by patient sits in the wheelchair for greater than 8 hours/day placing her at high risk for skin breakdown.  This equipment is necessary for the duration of my patients lifetime.    Review of Systems  Constitutional: Positive for fatigue. Negative for activity change, appetite change and fever.   HENT: Negative for congestion.    Respiratory: Negative for cough, shortness of breath and wheezing.    Cardiovascular: Negative for chest pain and leg swelling.   Gastrointestinal:. Negative for abdominal distention, abdominal pain, diarrhea and nausea.   Genitourinary: Negative for dysuria.   Musculoskeletal: Positive for arthralgias. Negative for back pain.   Skin: Negative for color change and wound.   Neurological: Negative for dizziness.   Psychiatric/Behavioral: Negative for agitation, behavioral problems and confusion.          Vitals:    01/07/19 1331   BP: 112/75   Pulse: 86   Resp: 18   Temp: 97.2  F (36.2  C)   SpO2: 93%   Weight: 150 lb 6.4 oz (68.2 kg)       Physical Exam  Constitutional: She is oriented to person, place, and time. She appears well-developed and well-nourished.   pleasant woman in no acute distress   HENT:   Head: Normocephalic and atraumatic.   Eyes: Conjunctivae are normal. Pupils are equal, round, and reactive  to light.   Neck: Normal range of motion. Neck supple.   Cardiovascular: Normal rate, regular rhythm and normal heart sounds.   No murmur heard.  Pulmonary/Chest: Effort normal and breath sounds normal. She has no wheezes. She has no rales.   No longer requiring oxygen.  Abdominal: Soft. Bowel sounds are normal. She exhibits no distension. There is no tenderness.   Musculoskeletal: She exhibits no edema.   Decreased ROM RLE  right leg with a slight adduction  Neurological: She is alert and oriented to person, place, and time.   Skin: Skin is warm and dry.   Right hip incision - healed.   Psychiatric: She has a normal mood and affect. Her behavior is normal.       MEDICATION LIST:  Current Outpatient Medications   Medication Sig     acetaminophen (TYLENOL) 500 MG tablet Take 2 tablets (1,000 mg total) by mouth 3 (three) times a day. (Patient taking differently: Take 1,000 mg by mouth 3 (three) times a day And 1000 mg  once daily prn.      )     albuterol (PROAIR HFA;PROVENTIL HFA;VENTOLIN HFA) 90 mcg/actuation inhaler Inhale 2 puffs every 6 (six) hours as needed for wheezing.     bisacodyl (DULCOLAX) 10 mg suppository Insert 10 mg into the rectum daily as needed.     FLUoxetine (PROZAC) 20 MG capsule Take 20 mg by mouth daily Take with 40 mg capsule for a total daily dose of 60 mg .     FLUoxetine (PROZAC) 40 MG capsule Take 40 mg by mouth daily Take with 20 mg capsule for a total daily dose of 60 mg .     furosemide (LASIX) 20 MG tablet Take 1 tablet (20 mg total) by mouth daily.     gabapentin (NEURONTIN) 100 MG capsule Take 100 mg by mouth 2 (two) times a day.     ipratropium-albuterol (DUO-NEB) 0.5-2.5 mg/3 mL nebulizer Take 3 mL by nebulization 4 (four) times a day.     metoprolol tartrate (LOPRESSOR) 50 MG tablet Take 0.5 tablets (25 mg total) by mouth 2 (two) times a day. (Patient taking differently: Take 12.5 mg by mouth daily.       )     omeprazole (PRILOSEC) 40 MG capsule Take 40 mg by mouth daily before  breakfast.     polyethylene glycol (GLYCOLAX) 17 gram/dose powder Take 17 g by mouth daily as needed.     potassium chloride (KLOR-CON) 20 mEq packet Take 20 mEq by mouth 2 (two) times a day.     rizatriptan (MAXALT) 10 MG tablet Take 10 mg by mouth as needed for migraine May repeat in 2 hours if needed .     senna-docusate (SENNOSIDES-DOCUSATE SODIUM) 8.6-50 mg tablet Take 1 tablet by mouth 2 (two) times a day.     sucralfate (CARAFATE) 1 gram tablet Take 1 g by mouth daily as needed.     aspirin 325 MG tablet Take 1 tablet (325 mg total) by mouth 2 (two) times a day.       DISCHARGE DIAGNOSIS:    ICD-10-CM    1. S/P right hip fracture Z87.81    2. Anemia, unspecified type D64.9    3. Chronic obstructive pulmonary disease, unspecified COPD type (H) J44.9    4. Debility R53.81        MEDICAL EQUIPMENT NEEDS:  wheelchair    DISCHARGE PLAN/FACE TO FACE:  I certify that services are/were furnished while this patient was under the care of a physician and that a physician or an allowed non-physician practitioner (NPP), had a face-to-face encounter that meets the physician face-to-face encounter requirements. The encounter was in whole, or in part, related to the primary reason for home health. The patient is confined to his/her home and needs intermittent skilled nursing, physical therapy, speech-language pathology, or the continued need for occupational therapy. A plan of care has been established by a physician and is periodically reviewed by a physician.  Date of Face-to-Face Encounter:1/7/19    I certify that, based on my findings, the following services are medically necessary home health services: [facility] PT/OT/HHA and Nursing     My clinical findings support the need for the above skilled services because: PT/OT for continued strength and endurance following a right hip fracture, HHA to assist with ADl's, and Nursing for medication management     This patient is homebound because:She is deconditioned and easily  fatigued following right hip surgery and also with a DX of COPD. She also requires the use of a wheelchair making her unsafe to leave the home unassisted.   The patient is, or has been, under my care and I have initiated the establishment of the plan of care. This patient will be followed by a physician who will periodically review the plan of care.    Schedule follow up visit with primary care provider within 7 days to reestablish care.    Electronically signed by: Nathalia Carroll CNP       Electronically signed by: Marilyn Arias MD

## 2021-06-22 NOTE — PROGRESS NOTES
[facility]    Facility:   [facility] [[id number]]   Code Status: FULL CODE       Chief Complaint   Patient presents with     Follow Up     LTC 12/30/18.       HPI:   Johana is a 68 y.o. female who was recently hospitalized as per below.     67-year-old female with past medical history of hypertension, hyperlipidemia, chronic back pain, COPD, smoking abuse, diastolic CHF, who presented to the emergency room for evaluation of right hip pain after fall, she was admitted with hip fracture.    Patient postoperatively developed acute pulmonary edema with respiratory failure, requiring transfer to ICU for BiPAP.     Acute respiratory failure with hypoxia and hypercapnia,  - due to mucous plugging, volume overload/pulmonary edema, and community-acquired pneumonia plus history of COPD  - Improving, patient is off BiPAP .  - Appreciate pulmonary consult  - Continue with scheduled DuoNeb, as needed albuterol neb.    - Transfer out of ICU yesterday  - Patient still requires oxygen, down to 2- 3 L, likely will need oxygen at TCU given her long COPD history     Acute on chronic CHF   - Diastolic dysfunction, with pulmonary edema and respiratory failure.   - Probably precipitated by mucous plug and pneumonia  - BNP elevated at 581.  - Echo showed EF 67% plus diastolic dysfunction.  - Bedside IVC ultrasound on 11/21-showed non-collapsible IVC.    - Improving with IV diuresis   - Switch to oral Lasix  - Follow-up with CHF clinic as outpatient     Sepsis  - Probably secondary to pneumonia and/or mucous plugging.   - Improving  - Continue antibiotic with ceftriaxone and doxycycline.   - White blood cells is improving     Acute right hip fracture  - Status post surgery  - POD#7  - Appreciate orthopedic consult  - Postoperative orders as per orthopedics  - Continue PT/OT evaluation  - Plan for TCU on discharge     Essential hypertension  - holding home antihypertensive, to give room for  diuresis.  - Restart low dose of metoprolol because of increased heart rate with holding paramedics  - Continue to monitor blood pressure at TCU     JANE (acute kidney injury)   - Probably secondary to sepsis  - Improving, discontinue IV fluid  - Avoid nephrotoxins drugs  - Continue to monitor renal function at TCU     COPD  - Chronic, patient was still smoking  - Discussed with patient to quit smoking  - Continue inhalers and nebs  - Continue  oxygen at TCU with plan to wean off as tolerated     Anxiety and depression  - continue home medications     Chronic back pain   - Stable  - Continue with pain control as above      Hypokalemia  - Replace per protocol  - Monitor potassium level at TCU     Smoking dependence  - Discussed with patient to quit  - Continue nicotine patch     Weakness and deconditioning  - Secondary to above  - PT/OT evaluation  - Plan for TCU on discharge     Overall stabilized and discharged to TCU on 11/25/18 for PT, OT, nursing cares, medical management and monitoring.       Past Medical History:  Past Medical History:   Diagnosis Date     Chronic bilateral low back pain      Depression      HTN (hypertension)      Idiopathic scoliosis of lumbar region      Incontinent of urine      Tobacco abuse        Medications:  Current Outpatient Medications   Medication Sig     acetaminophen (TYLENOL) 500 MG tablet Take 2 tablets (1,000 mg total) by mouth 3 (three) times a day. (Patient taking differently: Take 1,000 mg by mouth 3 (three) times a day And 1000 mg  once daily prn.      )     albuterol (PROAIR HFA;PROVENTIL HFA;VENTOLIN HFA) 90 mcg/actuation inhaler Inhale 2 puffs every 6 (six) hours as needed for wheezing.     amLODIPine (NORVASC) 5 MG tablet Take 5 mg by mouth every evening.     aspirin 325 MG tablet Take 1 tablet (325 mg total) by mouth 2 (two) times a day.     FLUoxetine (PROZAC) 20 MG capsule Take 20 mg by mouth daily Take with 40 mg capsule for a total daily dose of 60 mg .      FLUoxetine (PROZAC) 40 MG capsule Take 40 mg by mouth daily Take with 20 mg capsule for a total daily dose of 60 mg .     furosemide (LASIX) 20 MG tablet Take 1 tablet (20 mg total) by mouth daily.     gabapentin (NEURONTIN) 100 MG capsule Take 100 mg by mouth 2 (two) times a day.     ipratropium-albuterol (DUO-NEB) 0.5-2.5 mg/3 mL nebulizer Take 3 mL by nebulization 4 (four) times a day.     metoprolol tartrate (LOPRESSOR) 50 MG tablet Take 0.5 tablets (25 mg total) by mouth 2 (two) times a day.     omeprazole (PRILOSEC) 40 MG capsule Take 40 mg by mouth daily before breakfast.     potassium chloride (KLOR-CON) 20 mEq packet Take 20 mEq by mouth 2 (two) times a day.     rizatriptan (MAXALT) 10 MG tablet Take 10 mg by mouth as needed for migraine May repeat in 2 hours if needed .     senna-docusate (SENNOSIDES-DOCUSATE SODIUM) 8.6-50 mg tablet Take 1 tablet by mouth 2 (two) times a day.     sucralfate (CARAFATE) 1 gram tablet Take 1 g by mouth daily as needed.       Physical Exam:   General: Patient is alert.  Vitals: Reviewed.  HEENT: Head is NCAT. Eyes show no injection or icterus. Nares negative. Oropharynx well hydrated.  Neck: Supple. No tenderness or adenopathy. No JVD.  Lungs: Clear bilaterally. No wheezes.  Cardiovascular: Regular rate and rhythm, normal S1. S2.  Back: No spinal or CVA tenderness.  Abdomen: Soft, no tenderness on exam. Bowel sounds present. No guarding rebound or rigidity.  Extremities: No distal edema is noted.  Musculoskeletal: Healing right hip incision.   Psych: Mood appears good.      Labs:  Results for orders placed or performed in visit on 11/27/18   Basic Metabolic Panel   Result Value Ref Range    Sodium 139 136 - 145 mmol/L    Potassium 4.3 3.5 - 5.0 mmol/L    Chloride 102 98 - 107 mmol/L    CO2 29 22 - 31 mmol/L    Anion Gap, Calculation 8 5 - 18 mmol/L    Glucose 83 70 - 125 mg/dL    Calcium 9.9 8.5 - 10.5 mg/dL    BUN 26 (H) 8 - 22 mg/dL    Creatinine 0.92 0.60 - 1.10 mg/dL     GFR MDRD Af Amer >60 >60 mL/min/1.73m2    GFR MDRD Non Af Amer >60 >60 mL/min/1.73m2       Lab Results   Component Value Date    WBC 11.5 (H) 11/25/2018    HGB 10.4 (L) 12/21/2018    HCT 30.4 (L) 11/25/2018    MCV 92 11/25/2018     (H) 11/25/2018         Assessment/Plan:  1. Right hip fx. S/p surgical repair on 11/18/18. Follow up per ortho, continue here in therapy.  2. Anemia. Post op acute blood loss. Stable.  3. COPD. Now off oxygen, stable.  4. Debility. Encourage therapy, mobility, strengthening.          Electronically signed by: Marilyn Arias MD

## 2021-06-22 NOTE — PROGRESS NOTES
[facility]    Facility:   [facility] [[id number]]   Code Status: FULL CODE       Chief Complaint   Patient presents with     Follow Up     TCU 12/20/18.       HPI:   Johana is a 68 y.o. female who was recently hospitalized as per below.     67-year-old female with past medical history of hypertension, hyperlipidemia, chronic back pain, COPD, smoking abuse, diastolic CHF, who presented to the emergency room for evaluation of right hip pain after fall, she was admitted with hip fracture.    Patient postoperatively developed acute pulmonary edema with respiratory failure, requiring transfer to ICU for BiPAP.     Acute respiratory failure with hypoxia and hypercapnia,  - due to mucous plugging, volume overload/pulmonary edema, and community-acquired pneumonia plus history of COPD  - Improving, patient is off BiPAP .  - Appreciate pulmonary consult  - Continue with scheduled DuoNeb, as needed albuterol neb.    - Transfer out of ICU yesterday  - Patient still requires oxygen, down to 2- 3 L, likely will need oxygen at TCU given her long COPD history     Acute on chronic CHF   - Diastolic dysfunction, with pulmonary edema and respiratory failure.   - Probably precipitated by mucous plug and pneumonia  - BNP elevated at 581.  - Echo showed EF 67% plus diastolic dysfunction.  - Bedside IVC ultrasound on 11/21-showed non-collapsible IVC.    - Improving with IV diuresis   - Switch to oral Lasix  - Follow-up with CHF clinic as outpatient     Sepsis  - Probably secondary to pneumonia and/or mucous plugging.   - Improving  - Continue antibiotic with ceftriaxone and doxycycline.   - White blood cells is improving     Acute right hip fracture  - Status post surgery  - POD#7  - Appreciate orthopedic consult  - Postoperative orders as per orthopedics  - Continue PT/OT evaluation  - Plan for TCU on discharge     Essential hypertension  - holding home antihypertensive, to give room for  diuresis.  - Restart low dose of metoprolol because of increased heart rate with holding paramedics  - Continue to monitor blood pressure at TCU     JANE (acute kidney injury)   - Probably secondary to sepsis  - Improving, discontinue IV fluid  - Avoid nephrotoxins drugs  - Continue to monitor renal function at TCU     COPD  - Chronic, patient was still smoking  - Discussed with patient to quit smoking  - Continue inhalers and nebs  - Continue  oxygen at TCU with plan to wean off as tolerated     Anxiety and depression  - continue home medications     Chronic back pain   - Stable  - Continue with pain control as above      Hypokalemia  - Replace per protocol  - Monitor potassium level at TCU     Smoking dependence  - Discussed with patient to quit  - Continue nicotine patch     Weakness and deconditioning  - Secondary to above  - PT/OT evaluation  - Plan for TCU on discharge     Overall stabilized and discharged to TCU on 11/25/18 for PT, OT, nursing cares, medical management and monitoring.       Today:  She is progressing slowly with therapy. Still with pain in hip. Saw ortho, no concerns. She has now weaned off oxygen. Denies any shortness of breath. No wheezing. Feels breathing is at baseline. Appetite is decreased. She has been constipated. No abdominal pain. No fever or cough.       Past Medical History:  Past Medical History:   Diagnosis Date     Chronic bilateral low back pain      Depression      HTN (hypertension)      Idiopathic scoliosis of lumbar region      Incontinent of urine      Tobacco abuse        Medications:  Current Outpatient Medications   Medication Sig     acetaminophen (TYLENOL) 500 MG tablet Take 2 tablets (1,000 mg total) by mouth 3 (three) times a day. (Patient taking differently: Take 1,000 mg by mouth 3 (three) times a day And 1000 mg  once daily prn.      )     albuterol (PROAIR HFA;PROVENTIL HFA;VENTOLIN HFA) 90 mcg/actuation inhaler Inhale 2 puffs every 6 (six) hours as needed for  wheezing.     amLODIPine (NORVASC) 5 MG tablet Take 5 mg by mouth every evening.     aspirin 325 MG tablet Take 1 tablet (325 mg total) by mouth 2 (two) times a day.     FLUoxetine (PROZAC) 20 MG capsule Take 20 mg by mouth daily Take with 40 mg capsule for a total daily dose of 60 mg .     FLUoxetine (PROZAC) 40 MG capsule Take 40 mg by mouth daily Take with 20 mg capsule for a total daily dose of 60 mg .     furosemide (LASIX) 20 MG tablet Take 1 tablet (20 mg total) by mouth daily.     gabapentin (NEURONTIN) 100 MG capsule Take 100 mg by mouth 2 (two) times a day.     ipratropium-albuterol (DUO-NEB) 0.5-2.5 mg/3 mL nebulizer Take 3 mL by nebulization 4 (four) times a day.     metoprolol tartrate (LOPRESSOR) 50 MG tablet Take 0.5 tablets (25 mg total) by mouth 2 (two) times a day.     nicotine (NICODERM CQ) 14 mg/24 hr Place 1 patch on the skin daily.     omeprazole (PRILOSEC) 40 MG capsule Take 40 mg by mouth daily before breakfast.     potassium chloride (KLOR-CON) 20 mEq packet Take 20 mEq by mouth 2 (two) times a day.     rizatriptan (MAXALT) 10 MG tablet Take 10 mg by mouth as needed for migraine May repeat in 2 hours if needed .     senna-docusate (SENNOSIDES-DOCUSATE SODIUM) 8.6-50 mg tablet Take 1 tablet by mouth 2 (two) times a day.     sucralfate (CARAFATE) 1 gram tablet Take 1 g by mouth daily as needed.       Physical Exam:   General: Patient is alert.  HEENT: Head is NCAT. Eyes show no injection or icterus. Nares negative. Oropharynx well hydrated.  Neck: Supple. No tenderness or adenopathy. No JVD.  Lungs: Clear bilaterally. No wheezes.  Cardiovascular: Regular rate and rhythm, normal S1. S2.  Back: No spinal or CVA tenderness.  Abdomen: Soft, no tenderness on exam. Bowel sounds present. No guarding rebound or rigidity.  : Deferred.  Extremities: No distal edema is noted.  Musculoskeletal: Healing right hip incision.   Psych: Mood appears good.      Labs:  Results for orders placed or performed in  visit on 11/27/18   Basic Metabolic Panel   Result Value Ref Range    Sodium 139 136 - 145 mmol/L    Potassium 4.3 3.5 - 5.0 mmol/L    Chloride 102 98 - 107 mmol/L    CO2 29 22 - 31 mmol/L    Anion Gap, Calculation 8 5 - 18 mmol/L    Glucose 83 70 - 125 mg/dL    Calcium 9.9 8.5 - 10.5 mg/dL    BUN 26 (H) 8 - 22 mg/dL    Creatinine 0.92 0.60 - 1.10 mg/dL    GFR MDRD Af Amer >60 >60 mL/min/1.73m2    GFR MDRD Non Af Amer >60 >60 mL/min/1.73m2       Lab Results   Component Value Date    WBC 11.5 (H) 11/25/2018    HGB 10.2 (L) 11/25/2018    HCT 30.4 (L) 11/25/2018    MCV 92 11/25/2018     (H) 11/25/2018         Assessment/Plan:  1. Right hip fracture. S/p surgery on 11/18/18. Continue in therapy.   2. Anemia. Post op ABLA. Stable.  3. COPD. No current acute issues.   4. Hypoxic respiratory failure. Resolved. No longer requires oxygen.  5. Debility. Continue efforts in therapy as able.          Electronically signed by: Marilyn Arias MD

## 2021-06-22 NOTE — PROGRESS NOTES
[facility]    Facility:   [facility] [[id number]]   Code Status: FULL CODE      CHIEF COMPLAINT/REASON FOR VISIT:  Chief Complaint   Patient presents with     Review Of Multiple Medical Conditions       HISTORY:      HPI: Johana is a 68 y.o. female undergoing physical , occupational  and speech therapy at [facility]. She is with past medical history of hypertension, hyperlipidemia, chronic back pain, COPD, smoking abuse, diastolic CHF, who presented to the emergency room for evaluation of right hip pain after fall, she was admitted with hip fracture.  Patient postoperatively developed acute pulmonary edema with respiratory failure, requiring transfer to ICU for BiPAP.    Today she is seen in her room as a routine visit to review multiple medical issues. She is down to 1L NC and sats are 94%. Her hip incision is healing well. She denied CP or shortness of breath. She denied cough or congestion, feels slightly constipated. Right leg slightly adducted. She tells me the MD is aware and that she has sever scoliosis. BP's on the lower end and she denied dizziness. Her HGB is improving and is up to  10.2 up from 9.9.    Past Medical History:   Diagnosis Date     Chronic bilateral low back pain      Depression      HTN (hypertension)      Idiopathic scoliosis of lumbar region      Incontinent of urine      Tobacco abuse              Family History   Problem Relation Age of Onset     Chronic Kidney Disease Father      Diabetes Father      Social History     Socioeconomic History     Marital status:      Spouse name: Not on file     Number of children: Not on file     Years of education: Not on file     Highest education level: Not on file   Social Needs     Financial resource strain: Not on file     Food insecurity - worry: Not on file     Food insecurity - inability: Not on file     Transportation needs - medical: Not on file     Transportation needs -  non-medical: Not on file   Occupational History     Not on file   Tobacco Use     Smoking status: Current Every Day Smoker     Packs/day: 0.50     Years: 25.00     Pack years: 12.50     Types: Cigarettes   Substance and Sexual Activity     Alcohol use: No     Frequency: Never     Drug use: No     Sexual activity: Not on file   Other Topics Concern     Not on file   Social History Narrative     Not on file         Review of Systems   Constitutional: Positive for fatigue. Negative for activity change, appetite change and fever.   HENT: Negative for congestion.    Respiratory: Negative for cough, shortness of breath and wheezing.    Cardiovascular: Negative for chest pain and leg swelling.   Gastrointestinal: Positive for constipation. Negative for abdominal distention, abdominal pain, diarrhea and nausea.   Genitourinary: Negative for dysuria.   Musculoskeletal: Positive for arthralgias. Negative for back pain.   Skin: Negative for color change and wound.   Neurological: Negative for dizziness.   Psychiatric/Behavioral: Negative for agitation, behavioral problems and confusion.       .  Vitals:    12/10/18 0801   BP: 96/62   Pulse: 90   Resp: 20   Temp: 98.3  F (36.8  C)   SpO2: 94%   Weight: 150 lb (68 kg)       Physical Exam   Constitutional: She is oriented to person, place, and time. She appears well-developed and well-nourished.   pleasant woman in no acute distress   HENT:   Head: Normocephalic and atraumatic.   Eyes: Conjunctivae are normal. Pupils are equal, round, and reactive to light.   Neck: Normal range of motion. Neck supple.   Cardiovascular: Normal rate, regular rhythm and normal heart sounds.   No murmur heard.  Pulmonary/Chest: Effort normal and breath sounds normal. She has no wheezes. She has no rales.   On 1L NC- nursing to wean   Abdominal: Soft. Bowel sounds are normal. She exhibits no distension. There is no tenderness.   Musculoskeletal: She exhibits no edema.   Decreased ROM RLE    Neurological: She is alert and oriented to person, place, and time.   Skin: Skin is warm and dry.   Right hip incision - healing   Psychiatric: She has a normal mood and affect. Her behavior is normal.         LABS:   No results found for this or any previous visit (from the past 240 hour(s)).  Current Outpatient Medications   Medication Sig     acetaminophen (TYLENOL) 500 MG tablet Take 2 tablets (1,000 mg total) by mouth 3 (three) times a day. (Patient taking differently: Take 1,000 mg by mouth 3 (three) times a day And 1000 mg  once daily prn.      )     albuterol (PROAIR HFA;PROVENTIL HFA;VENTOLIN HFA) 90 mcg/actuation inhaler Inhale 2 puffs every 6 (six) hours as needed for wheezing.     amLODIPine (NORVASC) 5 MG tablet Take 5 mg by mouth every evening.     aspirin 325 MG tablet Take 1 tablet (325 mg total) by mouth 2 (two) times a day.     FLUoxetine (PROZAC) 20 MG capsule Take 20 mg by mouth daily Take with 40 mg capsule for a total daily dose of 60 mg .     FLUoxetine (PROZAC) 40 MG capsule Take 40 mg by mouth daily Take with 20 mg capsule for a total daily dose of 60 mg .     furosemide (LASIX) 20 MG tablet Take 1 tablet (20 mg total) by mouth daily.     gabapentin (NEURONTIN) 100 MG capsule Take 100 mg by mouth 2 (two) times a day.     ipratropium-albuterol (DUO-NEB) 0.5-2.5 mg/3 mL nebulizer Take 3 mL by nebulization 4 (four) times a day.     metoprolol tartrate (LOPRESSOR) 50 MG tablet Take 0.5 tablets (25 mg total) by mouth 2 (two) times a day.     nicotine (NICODERM CQ) 14 mg/24 hr Place 1 patch on the skin daily.     omeprazole (PRILOSEC) 40 MG capsule Take 40 mg by mouth daily before breakfast.     potassium chloride (KLOR-CON) 20 mEq packet Take 20 mEq by mouth 2 (two) times a day.     rizatriptan (MAXALT) 10 MG tablet Take 10 mg by mouth as needed for migraine May repeat in 2 hours if needed .     sucralfate (CARAFATE) 1 gram tablet Take 1 g by mouth daily as needed.     ASSESSMENT:       ICD-10-CM    1. Closed fracture of right hip with routine healing, subsequent encounter S72.001D    2. Chronic obstructive pulmonary disease, unspecified COPD type (H) J44.9    3. Anemia, unspecified type D64.9    4. Tobacco dependence F17.200        PLAN:    Right hip fracture - S/P surgery, Continue PT/OT  COPD- continue albuterol inhaler and scheduled duonebs. Wean oxygen to RA to keep sats >92%  Anemia- last hgb 10.2 up from 9.9  Tobacco use- continue nicotine patch  Constipation start senna S BID      Electronically signed by: Nathalia Carroll, CNP

## 2021-07-05 PROBLEM — R41.82 AMS (ALTERED MENTAL STATUS): Status: ACTIVE | Noted: 2021-01-01

## 2021-07-05 PROBLEM — Z87.81 HISTORY OF FRACTURE OF RIGHT HIP: Status: ACTIVE | Noted: 2018-11-18

## 2021-07-05 PROBLEM — J96.00 ACUTE RESPIRATORY FAILURE (H): Status: ACTIVE | Noted: 2021-01-01

## 2021-07-05 PROBLEM — M70.32 BURSITIS OF LEFT ELBOW: Status: ACTIVE | Noted: 2017-01-03

## 2021-07-05 PROBLEM — Z72.0 TOBACCO ABUSE: Status: ACTIVE | Noted: 2017-12-07

## 2021-07-05 PROBLEM — F43.21 ADJUSTMENT DISORDER WITH DEPRESSED MOOD: Status: ACTIVE | Noted: 2018-01-24
